# Patient Record
Sex: FEMALE
[De-identification: names, ages, dates, MRNs, and addresses within clinical notes are randomized per-mention and may not be internally consistent; named-entity substitution may affect disease eponyms.]

---

## 2022-08-03 PROBLEM — Z00.00 ENCOUNTER FOR PREVENTIVE HEALTH EXAMINATION: Status: ACTIVE | Noted: 2022-08-03

## 2023-01-10 ENCOUNTER — NON-APPOINTMENT (OUTPATIENT)
Age: 53
End: 2023-01-10

## 2023-01-10 ENCOUNTER — APPOINTMENT (OUTPATIENT)
Dept: ENDOCRINOLOGY | Facility: CLINIC | Age: 53
End: 2023-01-10
Payer: COMMERCIAL

## 2023-01-10 VITALS
HEIGHT: 71 IN | BODY MASS INDEX: 36.54 KG/M2 | WEIGHT: 261 LBS | HEART RATE: 96 BPM | DIASTOLIC BLOOD PRESSURE: 80 MMHG | SYSTOLIC BLOOD PRESSURE: 120 MMHG | OXYGEN SATURATION: 98 %

## 2023-01-10 DIAGNOSIS — Z82.49 FAMILY HISTORY OF ISCHEMIC HEART DISEASE AND OTHER DISEASES OF THE CIRCULATORY SYSTEM: ICD-10-CM

## 2023-01-10 DIAGNOSIS — Z83.3 FAMILY HISTORY OF DIABETES MELLITUS: ICD-10-CM

## 2023-01-10 DIAGNOSIS — Z78.9 OTHER SPECIFIED HEALTH STATUS: ICD-10-CM

## 2023-01-10 DIAGNOSIS — E66.9 OBESITY, UNSPECIFIED: ICD-10-CM

## 2023-01-10 LAB — GLUCOSE BLDC GLUCOMTR-MCNC: 161

## 2023-01-10 PROCEDURE — 82962 GLUCOSE BLOOD TEST: CPT | Mod: NC

## 2023-01-10 PROCEDURE — 99204 OFFICE O/P NEW MOD 45 MIN: CPT | Mod: 25

## 2023-01-10 RX ORDER — CA/D3/MAG OX/ZINC/COP/MANG/BOR 600 MG-800
TABLET,CHEWABLE ORAL
Refills: 0 | Status: ACTIVE | COMMUNITY

## 2023-01-10 RX ORDER — DULAGLUTIDE 0.75 MG/.5ML
0.75 INJECTION, SOLUTION SUBCUTANEOUS
Refills: 0 | Status: DISCONTINUED | COMMUNITY
End: 2023-01-10

## 2023-01-10 RX ORDER — OMEGA-3/DHA/EPA/FISH OIL 300-1000MG
CAPSULE ORAL
Refills: 0 | Status: ACTIVE | COMMUNITY

## 2023-01-10 NOTE — REVIEW OF SYSTEMS
[Nausea] : nausea [Pain/Numbness of Digits] : pain/numbness of digits [Insomnia] : insomnia [Recent Weight Gain (___ Lbs)] : no recent weight gain [Recent Weight Loss (___ Lbs)] : no recent weight loss [Chest Pain] : no chest pain [Shortness Of Breath] : no shortness of breath [Polyuria] : no polyuria [Muscle Cramps] : no muscle cramps [Ulcer] : no ulcer [Polydipsia] : no polydipsia [FreeTextEntry3] : + DR

## 2023-01-10 NOTE — HISTORY OF PRESENT ILLNESS
[FreeTextEntry1] : Here to establish care for DM.\par Transferring care from [facility].\par Was diagnosed with Type 2 DM at age 30.\par She has a strong family history of DM with many siblings, father and mother with Type 2 DM.  \par Has associated complications of mild retinopathy and neuropathy.\par Her control has improved with medications.    \par PMH of HTN, HLD and Obesity.  \par \par \par Currently Diabetic Medication regimen\par Metformin ER 1000 mg BID\par Pioglitazone 30 mg daily\par Trulicity 0.75 mg qweek  (added about one year ago)-  had difficulty tolerating in the past due to nausea.  \par Was intolerant to Farxiga due to yeast infections.    \par \par SMBG:  does test regularly at home (one touch ultra).

## 2023-01-10 NOTE — PHYSICAL EXAM
[Obese] : obese [No Acute Distress] : no acute distress [Normal Sclera/Conjunctiva] : normal sclera/conjunctiva [No Proptosis] : no proptosis [No Neck Mass] : no neck mass was observed [No LAD] : no lymphadenopathy [Supple] : the neck was supple [Thyroid Not Enlarged] : the thyroid was not enlarged [No Thyroid Nodules] : no palpable thyroid nodules [No Respiratory Distress] : no respiratory distress [Clear to Auscultation] : lungs were clear to auscultation bilaterally [Normal S1, S2] : normal S1 and S2 [No Murmurs] : no murmurs [Normal Rate] : heart rate was normal [Regular Rhythm] : with a regular rhythm [No Edema] : no peripheral edema [Normal Gait] : normal gait [No Clubbing, Cyanosis] : no clubbing  or cyanosis of the fingernails [Right foot was examined, including] : right foot ~C was examined, including visual inspection with sensory and pulse exams [Left foot was examined, including] : left foot ~C was examined, including visual inspection with sensory and pulse exams [Normal] : normal [1+] : 1+ in the dorsalis pedis [No Tremors] : no tremors [Normal Affect] : the affect was normal [Normal Insight/Judgement] : insight and judgment were intact [Normal Mood] : the mood was normal [Acanthosis Nigricans] : no acanthosis nigricans

## 2023-05-05 LAB
HBA1C MFR BLD HPLC: 6.6
LDLC SERPL CALC-MCNC: 97
MICROALBUMIN/CREAT 24H UR-RTO: 6

## 2023-05-08 ENCOUNTER — APPOINTMENT (OUTPATIENT)
Dept: ENDOCRINOLOGY | Facility: CLINIC | Age: 53
End: 2023-05-08
Payer: COMMERCIAL

## 2023-05-08 VITALS
HEIGHT: 71 IN | WEIGHT: 264 LBS | BODY MASS INDEX: 36.96 KG/M2 | SYSTOLIC BLOOD PRESSURE: 124 MMHG | DIASTOLIC BLOOD PRESSURE: 78 MMHG | HEART RATE: 78 BPM | OXYGEN SATURATION: 97 %

## 2023-05-08 LAB — GLUCOSE BLDC GLUCOMTR-MCNC: 108

## 2023-05-08 PROCEDURE — 99214 OFFICE O/P EST MOD 30 MIN: CPT | Mod: 25

## 2023-05-08 PROCEDURE — 82962 GLUCOSE BLOOD TEST: CPT | Mod: NC

## 2023-05-08 NOTE — REVIEW OF SYSTEMS
[Recent Weight Gain (___ Lbs)] : no recent weight gain [Recent Weight Loss (___ Lbs)] : no recent weight loss [Nausea] : no nausea

## 2023-05-08 NOTE — HISTORY OF PRESENT ILLNESS
[FreeTextEntry1] : Follow up DM\par \par Was diagnosed with Type 2 DM at age 30.\par She has a strong family history of DM with many siblings, father and mother with Type 2 DM.  \par Has associated complications of mild retinopathy and neuropathy.\par Her control has improved with medications.    \par PMH of HTN, HLD and Obesity.  \par \par \par Currently Diabetic Medication regimen\par Metformin ER 1000 mg BID\par Pioglitazone 30 mg daily\par Trulicity 1.5  mg qweek  (has been able to tolerate this dose without much nausea).  \par Was intolerant to Farxiga due to yeast infections.    \par \par SMBG:  does not test regularly at home (one touch ultra).

## 2023-05-08 NOTE — ASSESSMENT
[FreeTextEntry1] : 52 year old male with Type 2 DM with associated neuropathy and retinopathy, HTN, HLD and obesity here for diabetes follow up.  Her diabetes is well controlled. \par \par 1.  Type 2 DM-     Continue current dose of Trulicity, Pioglitazone and Metformin.  Consider changing Trulicity to Mounjaro in the future for better A1c lowering potential and weight loss, which may allow us to taper off Pioglitazone, however patient would like to wait until supply issues are resolved.   \par \par 2.  HLD-   continue Atorvastatin\par \par 3.  HTN- continue Losartan- HCT.  \par \par Follow up in 4 months.

## 2023-09-01 ENCOUNTER — APPOINTMENT (OUTPATIENT)
Dept: ENDOCRINOLOGY | Facility: CLINIC | Age: 53
End: 2023-09-01
Payer: COMMERCIAL

## 2023-09-01 VITALS
BODY MASS INDEX: 37.54 KG/M2 | WEIGHT: 268.13 LBS | HEART RATE: 80 BPM | TEMPERATURE: 97.4 F | SYSTOLIC BLOOD PRESSURE: 110 MMHG | RESPIRATION RATE: 16 BRPM | HEIGHT: 71 IN | DIASTOLIC BLOOD PRESSURE: 70 MMHG | OXYGEN SATURATION: 98 %

## 2023-09-01 LAB
HBA1C MFR BLD HPLC: 6.5
LDLC SERPL DIRECT ASSAY-MCNC: 105

## 2023-09-01 PROCEDURE — 99214 OFFICE O/P EST MOD 30 MIN: CPT

## 2023-09-01 NOTE — HISTORY OF PRESENT ILLNESS
[FreeTextEntry1] : Follow up DM  Was diagnosed with Type 2 DM at age 30. She has a strong family history of DM with many siblings, father and mother with Type 2 DM.   Has associated complications of mild retinopathy and neuropathy. Her control has improved with medications.     PMH of HTN, HLD and Obesity.    Currently Diabetic Medication regimen Metformin ER 1000 mg BID Pioglitazone 30 mg daily Trulicity 1.5  mg qweek     Was intolerant to Farxiga due to yeast infections.      SMBG:  does not test regularly at home (one touch ultra).

## 2023-09-01 NOTE — ASSESSMENT
[FreeTextEntry1] : 53 year old male with Type 2 DM with associated neuropathy and retinopathy, HTN, HLD and obesity here for diabetes follow up.  Her diabetes is well controlled.   1.  Type 2 DM-   Will change Trulicity to Mounjaro for better efficacy and weight loss effects.  If next A1c improves, will start tapering off Actos.  Continue Metformin.    2.  HLD-   continue Atorvastatin.  Advised weight loss.  If next LDL is above target, will increase dose.    3.  HTN- continue Losartan- HCT.    Follow up in 4 months.

## 2023-09-12 RX ORDER — TIRZEPATIDE 5 MG/.5ML
5 INJECTION, SOLUTION SUBCUTANEOUS
Qty: 3 | Refills: 1 | Status: COMPLETED | COMMUNITY
Start: 2023-09-01 | End: 2023-09-12

## 2023-09-18 ENCOUNTER — NON-APPOINTMENT (OUTPATIENT)
Age: 53
End: 2023-09-18

## 2024-01-08 ENCOUNTER — APPOINTMENT (OUTPATIENT)
Dept: ENDOCRINOLOGY | Facility: CLINIC | Age: 54
End: 2024-01-08
Payer: COMMERCIAL

## 2024-01-08 VITALS
HEART RATE: 80 BPM | HEIGHT: 71 IN | OXYGEN SATURATION: 99 % | BODY MASS INDEX: 41.02 KG/M2 | SYSTOLIC BLOOD PRESSURE: 110 MMHG | WEIGHT: 293 LBS | DIASTOLIC BLOOD PRESSURE: 70 MMHG | RESPIRATION RATE: 16 BRPM | TEMPERATURE: 97.6 F

## 2024-01-08 LAB
HBA1C MFR BLD HPLC: 6.2
LDLC SERPL DIRECT ASSAY-MCNC: 94
MICROALBUMIN/CREAT 24H UR-RTO: 8

## 2024-01-08 PROCEDURE — 99214 OFFICE O/P EST MOD 30 MIN: CPT

## 2024-01-08 RX ORDER — PIOGLITAZONE HYDROCHLORIDE 30 MG/1
30 TABLET ORAL DAILY
Qty: 90 | Refills: 1 | Status: DISCONTINUED | COMMUNITY
Start: 1900-01-01 | End: 2024-01-08

## 2024-01-08 RX ORDER — DULAGLUTIDE 3 MG/.5ML
3 INJECTION, SOLUTION SUBCUTANEOUS
Qty: 3 | Refills: 0 | Status: DISCONTINUED | COMMUNITY
Start: 2023-01-10 | End: 2024-01-08

## 2024-01-08 NOTE — ASSESSMENT
[FreeTextEntry1] : 53 year old male with Type 2 DM with associated neuropathy and retinopathy, HTN, HLD and obesity here for diabetes follow up. Her diabetes control is improving.     1. Type 2 DM-   Will attempt to start weaning dose of Pioglitazone to reduce side effect risk, including weight gain.  Reduce to 15 mg daily.  Continue Metformin.   Will increase Trulicity to 4.5 mg qweek for added BG control as pioglitazone is weaned off.    2. HLD- continue Atorvastatin.  3. HTN- continue Losartan- HCT.  Follow up in 4 months

## 2024-01-08 NOTE — REVIEW OF SYSTEMS
[Recent Weight Loss (___ Lbs)] : recent weight loss: [unfilled] lbs [Chest Pain] : no chest pain [Shortness Of Breath] : no shortness of breath [Nausea] : no nausea [Constipation] : no constipation

## 2024-01-08 NOTE — HISTORY OF PRESENT ILLNESS
[FreeTextEntry1] : Follow up DM  Was diagnosed with Type 2 DM at age 30. She has a strong family history of DM with many siblings, father and mother with Type 2 DM.   Has associated complications of mild retinopathy and neuropathy. Her control has improved with medications.     PMH of HTN, HLD and Obesity.    Currently Diabetic Medication regimen Metformin ER 1000 mg BID Pioglitazone 30 mg daily Trulicity 3 mg qweek-  has been having trouble finding this dose.       Mounjaro was too costly. Was intolerant to Farxiga due to yeast infections.      SMBG:  does not test regularly at home (one touch ultra).     Weight has increased.

## 2024-01-16 RX ORDER — METFORMIN ER 500 MG 500 MG/1
500 TABLET ORAL
Qty: 360 | Refills: 1 | Status: ACTIVE | COMMUNITY
Start: 1900-01-01 | End: 1900-01-01

## 2024-04-02 RX ORDER — ATORVASTATIN CALCIUM 10 MG/1
10 TABLET, FILM COATED ORAL
Qty: 90 | Refills: 1 | Status: ACTIVE | COMMUNITY
Start: 1900-01-01 | End: 1900-01-01

## 2024-04-02 RX ORDER — LOSARTAN POTASSIUM AND HYDROCHLOROTHIAZIDE 12.5; 5 MG/1; MG/1
50-12.5 TABLET ORAL
Qty: 90 | Refills: 1 | Status: ACTIVE | COMMUNITY
Start: 1900-01-01 | End: 1900-01-01

## 2024-05-06 ENCOUNTER — APPOINTMENT (OUTPATIENT)
Dept: ENDOCRINOLOGY | Facility: CLINIC | Age: 54
End: 2024-05-06
Payer: COMMERCIAL

## 2024-05-06 VITALS
WEIGHT: 264 LBS | OXYGEN SATURATION: 98 % | BODY MASS INDEX: 36.96 KG/M2 | DIASTOLIC BLOOD PRESSURE: 78 MMHG | SYSTOLIC BLOOD PRESSURE: 112 MMHG | HEIGHT: 71 IN | RESPIRATION RATE: 16 BRPM | HEART RATE: 95 BPM

## 2024-05-06 DIAGNOSIS — E11.40 TYPE 2 DIABETES MELLITUS WITH DIABETIC NEUROPATHY, UNSPECIFIED: ICD-10-CM

## 2024-05-06 DIAGNOSIS — E78.5 HYPERLIPIDEMIA, UNSPECIFIED: ICD-10-CM

## 2024-05-06 DIAGNOSIS — I10 ESSENTIAL (PRIMARY) HYPERTENSION: ICD-10-CM

## 2024-05-06 LAB
HBA1C MFR BLD HPLC: 6.4
LDLC SERPL DIRECT ASSAY-MCNC: 96
LDLC SERPL DIRECT ASSAY-MCNC: 96

## 2024-05-06 PROCEDURE — 99214 OFFICE O/P EST MOD 30 MIN: CPT

## 2024-05-06 RX ORDER — PIOGLITAZONE HYDROCHLORIDE 15 MG/1
15 TABLET ORAL DAILY
Qty: 90 | Refills: 1 | Status: DISCONTINUED | COMMUNITY
Start: 2024-01-08 | End: 2024-05-06

## 2024-05-06 NOTE — HISTORY OF PRESENT ILLNESS
[FreeTextEntry1] : Follow up DM  Was diagnosed with Type 2 DM at age 30. She has a strong family history of DM with many siblings, father and mother with Type 2 DM.   Has associated complications of mild retinopathy and neuropathy. Her control has improved with medications.     PMH of HTN, HLD and Obesity.    Currently Diabetic Medication regimen Metformin ER 1000 mg BID Trulicity 4.5 mg qweek   (increased last visit) Mounjaro was too costly. Came off pioglitazone after last visit.    Was intolerant to Farxiga due to yeast infections.      SMBG:  does not test regularly at home (one touch ultra).    Reports that after meals is usually under 150 mg/dl.

## 2024-05-06 NOTE — DATA REVIEWED
[FreeTextEntry1] : LABS: 5/2/2024: A1c 6.4% LDL 96  8/28/2023:  A1c 6.5% TSH 1.28  8/8/2022: Glucose 155 A1c 6.8  UACR < 30

## 2024-05-06 NOTE — ASSESSMENT
[FreeTextEntry1] : 53 year old male with Type 2 DM with associated neuropathy and retinopathy, HTN, HLD and obesity here for diabetes follow up. Her diabetes is well controlled.    1. Type 2 DM-  Continue current dose of Trulicity and Metformin.    Patient does not want to try changing to Ozempic for more weight loss effect at this time.    2. HLD- continue Atorvastatin.  3. HTN- continue Losartan- HCT.  Follow up in 4 months

## 2024-09-19 PROBLEM — N81.9 FEMALE GENITAL PROLAPSE, UNSPECIFIED TYPE: Status: ACTIVE | Noted: 2024-09-19

## 2024-09-22 ENCOUNTER — NON-APPOINTMENT (OUTPATIENT)
Age: 54
End: 2024-09-22

## 2024-09-23 ENCOUNTER — APPOINTMENT (OUTPATIENT)
Dept: ENDOCRINOLOGY | Facility: CLINIC | Age: 54
End: 2024-09-23
Payer: COMMERCIAL

## 2024-09-23 ENCOUNTER — NON-APPOINTMENT (OUTPATIENT)
Age: 54
End: 2024-09-23

## 2024-09-23 VITALS
DIASTOLIC BLOOD PRESSURE: 60 MMHG | TEMPERATURE: 98 F | SYSTOLIC BLOOD PRESSURE: 110 MMHG | WEIGHT: 287 LBS | BODY MASS INDEX: 40.18 KG/M2 | OXYGEN SATURATION: 98 % | RESPIRATION RATE: 16 BRPM | HEIGHT: 71 IN | HEART RATE: 90 BPM

## 2024-09-23 DIAGNOSIS — E78.5 HYPERLIPIDEMIA, UNSPECIFIED: ICD-10-CM

## 2024-09-23 DIAGNOSIS — E11.40 TYPE 2 DIABETES MELLITUS WITH DIABETIC NEUROPATHY, UNSPECIFIED: ICD-10-CM

## 2024-09-23 DIAGNOSIS — I10 ESSENTIAL (PRIMARY) HYPERTENSION: ICD-10-CM

## 2024-09-23 PROCEDURE — 99214 OFFICE O/P EST MOD 30 MIN: CPT

## 2024-09-23 RX ORDER — ATORVASTATIN CALCIUM 20 MG/1
20 TABLET, FILM COATED ORAL
Qty: 90 | Refills: 0 | Status: ACTIVE | COMMUNITY
Start: 2024-06-25

## 2024-09-23 RX ORDER — TIRZEPATIDE 5 MG/.5ML
5 INJECTION, SOLUTION SUBCUTANEOUS
Qty: 1 | Refills: 3 | Status: ACTIVE | COMMUNITY
Start: 2024-09-23 | End: 1900-01-01

## 2024-09-23 NOTE — HISTORY OF PRESENT ILLNESS
[FreeTextEntry1] : Follow up DM  Was diagnosed with Type 2 DM at age 30. She has a strong family history of DM with many siblings, father and mother with Type 2 DM.   Has associated complications of mild retinopathy and neuropathy. Her control has improved with medications.     PMH of HTN, HLD and Obesity.    Currently Diabetic Medication regimen Metformin ER 1000 mg BID Trulicity 4.5 mg qweek   Mounjaro was too costly. Took pioglitazone in the past. Was intolerant to Farxiga due to yeast infections.      SMBG:  does not test regularly at home (one touch ultra).    PCP just increased Atorvastatin to 20 mg daily.

## 2024-09-23 NOTE — DATA REVIEWED
[FreeTextEntry1] : LABS: 9/16/2024:  A1c 7.4% TSH 1.29  5/2/2024: A1c 6.4% LDL 96  8/28/2023:  A1c 6.5% TSH 1.28  8/8/2022: Glucose 155 A1c 6.8  UACR < 30

## 2024-09-23 NOTE — ASSESSMENT
[FreeTextEntry1] : 54 year old male with Type 2 DM with associated neuropathy and retinopathy, HTN, HLD and obesity here for diabetes follow up. Her diabetes control worsened since last visit.     1. Type 2 DM-  Advised increased exercise and diet modification.  Patient will check insurance to see if Mounjaro is now covered.  If so, would change Trulicity to Mounjaro starting at 5 mg qweek for better efficacy.   Continue current dose of metformin.   2. HLD-   LDL is suboptimal.  Agree with increase in dose to 20 mg daily.    3. HTN- continue Losartan- HCT.  Follow up in 4 months

## 2024-09-23 NOTE — REVIEW OF SYSTEMS
[Recent Weight Loss (___ Lbs)] : recent weight loss: [unfilled] lbs [Nausea] : no nausea [Diarrhea] : no diarrhea

## 2024-09-24 ENCOUNTER — APPOINTMENT (OUTPATIENT)
Dept: UROGYNECOLOGY | Facility: CLINIC | Age: 54
End: 2024-09-24

## 2024-09-24 ENCOUNTER — RX RENEWAL (OUTPATIENT)
Age: 54
End: 2024-09-24

## 2024-09-24 VITALS
DIASTOLIC BLOOD PRESSURE: 83 MMHG | HEIGHT: 71 IN | WEIGHT: 254 LBS | SYSTOLIC BLOOD PRESSURE: 150 MMHG | BODY MASS INDEX: 35.56 KG/M2

## 2024-09-24 DIAGNOSIS — N81.9 FEMALE GENITAL PROLAPSE, UNSPECIFIED: ICD-10-CM

## 2024-09-24 DIAGNOSIS — E11.9 TYPE 2 DIABETES MELLITUS W/OUT COMPLICATIONS: ICD-10-CM

## 2024-09-24 DIAGNOSIS — K57.90 DIVERTICULOSIS OF INTESTINE, PART UNSPECIFIED, W/OUT PERFORATION OR ABSCESS W/OUT BLEEDING: ICD-10-CM

## 2024-09-24 DIAGNOSIS — Z82.49 FAMILY HISTORY OF ISCHEMIC HEART DISEASE AND OTHER DISEASES OF THE CIRCULATORY SYSTEM: ICD-10-CM

## 2024-09-24 DIAGNOSIS — J45.909 UNSPECIFIED ASTHMA, UNCOMPLICATED: ICD-10-CM

## 2024-09-24 DIAGNOSIS — E78.00 PURE HYPERCHOLESTEROLEMIA, UNSPECIFIED: ICD-10-CM

## 2024-09-24 DIAGNOSIS — Z80.1 FAMILY HISTORY OF MALIGNANT NEOPLASM OF TRACHEA, BRONCHUS AND LUNG: ICD-10-CM

## 2024-09-24 DIAGNOSIS — R32 UNSPECIFIED URINARY INCONTINENCE: ICD-10-CM

## 2024-09-24 DIAGNOSIS — Z82.5 FAMILY HISTORY OF ASTHMA AND OTHER CHRONIC LOWER RESPIRATORY DISEASES: ICD-10-CM

## 2024-09-24 DIAGNOSIS — S39.92XA UNSPECIFIED INJURY OF LOWER BACK, INITIAL ENCOUNTER: ICD-10-CM

## 2024-09-24 DIAGNOSIS — M53.9 DORSOPATHY, UNSPECIFIED: ICD-10-CM

## 2024-09-24 DIAGNOSIS — Z83.3 FAMILY HISTORY OF DIABETES MELLITUS: ICD-10-CM

## 2024-09-24 DIAGNOSIS — N81.6 RECTOCELE: ICD-10-CM

## 2024-09-24 DIAGNOSIS — Z80.3 FAMILY HISTORY OF MALIGNANT NEOPLASM OF BREAST: ICD-10-CM

## 2024-09-24 LAB
BILIRUB UR QL STRIP: NEGATIVE
CLARITY UR: CLEAR
COLLECTION METHOD: NORMAL
GLUCOSE UR-MCNC: NEGATIVE
HCG UR QL: 0.2 EU/DL
HGB UR QL STRIP.AUTO: NEGATIVE
KETONES UR-MCNC: NEGATIVE
LEUKOCYTE ESTERASE UR QL STRIP: NORMAL
NITRITE UR QL STRIP: NEGATIVE
PH UR STRIP: 5.5
PROT UR STRIP-MCNC: NEGATIVE
SP GR UR STRIP: 1.02

## 2024-09-24 PROCEDURE — 81003 URINALYSIS AUTO W/O SCOPE: CPT | Mod: QW

## 2024-09-24 PROCEDURE — 99204 OFFICE O/P NEW MOD 45 MIN: CPT | Mod: 25

## 2024-09-24 PROCEDURE — 99459 PELVIC EXAMINATION: CPT

## 2024-09-24 PROCEDURE — 51701 INSERT BLADDER CATHETER: CPT | Mod: 59

## 2024-09-24 RX ORDER — GABAPENTIN 250 MG/5ML
300 SOLUTION ORAL
Refills: 0 | Status: ACTIVE | COMMUNITY

## 2024-09-24 RX ORDER — COLD-HOT PACK
50 EACH MISCELLANEOUS
Refills: 0 | Status: ACTIVE | COMMUNITY

## 2024-09-24 RX ORDER — ATORVASTATIN CALCIUM 10 MG/1
10 TABLET, FILM COATED ORAL
Refills: 0 | Status: ACTIVE | COMMUNITY

## 2024-09-24 RX ORDER — LANOLIN ALCOHOL/MO/W.PET/CERES
500 CREAM (GRAM) TOPICAL
Refills: 0 | Status: ACTIVE | COMMUNITY

## 2024-09-24 NOTE — HISTORY OF PRESENT ILLNESS
[FreeTextEntry1] : BIJU is a 54 year female who presents for prolapse. She was told of the prolapse after birth of her son 20 years ago. Was living it without an issue until a few months ago and it got significantly worse. She feels pressure when she has to have a BM or urinate. She does have urgency and will sometimes not make it to the bathroom. No leaking with sneeze or cough> Not wearing pads. She does feel she empties her bladder fully but she always feels like she has to urinate. Is aware of a pessary but wants more definitive treatment.    HGBA1C 5/2024 6.4 - as per patient had one yesterday was told it was 7.4. Starting mounjaro as of this week.    Daytime frequency: yes Nocturia: 1-2 Urinary urgency: yes Leakage of urine with urgency: yes Leakage of urine with coughing sneezing laughing: denies Incontinence pad use: denies Sensation of incomplete bladder emptying: denies History of frequent urinary tract infections: denies History of hematuria: denies Previous treatment: kegel Vaginal symptoms: pressure Bowel symptoms: bowel urgency and occasional bowel accident

## 2024-09-24 NOTE — ADDENDUM
[FreeTextEntry1] : This note was written by Jody Mcmullen, acting as the  for Dr. Crouch. This note accurately reflects the work and decisions made by Dr. Crouch.

## 2024-09-24 NOTE — LETTER BODY
[Dear  ___] : Dear  [unfilled], [I had the pleasure of evaluating your patient, [unfilled]. Thank you for referring Ms. [unfilled] for consultation for ___] : I had the pleasure of evaluating your patient, [unfilled]. Thank you for referring Ms. [unfilled] for consultation for [unfilled]. [Attached please find my note.] : Attached please find my note. [Thank you very much for allowing me to participate in the care of this patient. If you have any questions, please do not hesitate to contact me] : Thank you very much for allowing me to participate in the care of this patient. If you have any questions, please do not hesitate to contact me. [DrRaine  ___] : Dr. MEJÍA

## 2024-09-24 NOTE — PHYSICAL EXAM
[Chaperone Present] : A chaperone was present in the examining room during all aspects of the physical examination [09983] : A chaperone was present during the pelvic exam. [No Acute Distress] : in no acute distress [Well developed] : well developed [Well Nourished] : ~L well nourished [Oriented x3] : oriented to person, place, and time [No Edema] : ~T edema was not present [Warm and Dry] : was warm and dry to touch [Normal Gait] : gait was normal [Normal Appearance] : general appearance was normal [2] : 2 [Aa ____] : Aa [unfilled] [Ba ____] : Ba [unfilled] [C ____] : C [unfilled] [GH ____] : GH [unfilled] [PB ____] : PB [unfilled] [TVL ____] : TVL  [unfilled] [Ap ____] : Ap [unfilled] [Bp ____] : Bp [unfilled] [D ____] : D [unfilled] [Normal] : no abnormalities [Post Void Residual ____ml] : post void residual was [unfilled] ml [FreeTextEntry2] : Rand [Cough] : no cough [Tenderness] : ~T no ~M abdominal tenderness observed [Distended] : not distended [Hernia] : no hernia observed

## 2024-09-24 NOTE — DISCUSSION/SUMMARY
[FreeTextEntry1] : BIJU is a 54 year female who presents for rectocele, urgency, leaking of urine. On exam, normal PVR. We talked about the etiology and natural progression of pelvic organ prolapse. We discussed the treatment options of a pessary, physical therapy or surgery. In terms of surgery we talked about open procedures, robotic assisted procedures and vaginal reconstruction with or without the utilization of graft material. We also discussed what is involved in recovery, post-op restrictions including restrictions on lifting, submerging in water and exercise. We discussed the difference between mesh augmented repairs and native tissue repairs.  We also discussed abdominal mesh versus vaginal mesh. I recommended bladder testing UDTs. I gave her some literature on pelvic floor muscle strengthening and posterior repair. I sent her urine to the lab. I was able to answer all of her questions. All questions answered.

## 2024-09-24 NOTE — OB HISTORY
[Vaginal ___] : [unfilled] vaginal delivery(s) [Approximately ___ (Month)] : the LMP was approximately [unfilled] month(s) ago [Last Pap Smear ___] : date of last pap smear was on [unfilled] [Sexually Active] : sexually active [Regular Cycle Intervals] : periods have been irregular [Abnormal Pap Smear] : normal pap smear [Taking Estrogens] : is not taking estrogen replacement [FreeTextEntry1] : baby weighed 9#14

## 2024-09-24 NOTE — PHYSICAL EXAM
[Chaperone Present] : A chaperone was present in the examining room during all aspects of the physical examination [01924] : A chaperone was present during the pelvic exam. [No Acute Distress] : in no acute distress [Well developed] : well developed [Well Nourished] : ~L well nourished [Oriented x3] : oriented to person, place, and time [No Edema] : ~T edema was not present [Warm and Dry] : was warm and dry to touch [Normal Gait] : gait was normal [Normal Appearance] : general appearance was normal [2] : 2 [Aa ____] : Aa [unfilled] [Ba ____] : Ba [unfilled] [C ____] : C [unfilled] [GH ____] : GH [unfilled] [PB ____] : PB [unfilled] [TVL ____] : TVL  [unfilled] [Ap ____] : Ap [unfilled] [Bp ____] : Bp [unfilled] [D ____] : D [unfilled] [Normal] : no abnormalities [Post Void Residual ____ml] : post void residual was [unfilled] ml [FreeTextEntry2] : Rand [Cough] : no cough [Tenderness] : ~T no ~M abdominal tenderness observed [Distended] : not distended [Hernia] : no hernia observed

## 2024-09-24 NOTE — PROCEDURE
[Straight Catheterization] : insertion of a straight catheter [Urgent Incontinence] : urgent incontinence [None] : there were no complications with the catheter insertion [No Complications] : no complications [Tolerated Well] : the patient tolerated the procedure well [Post procedure instructions and information given] : Post procedure instructions and information were given and reviewed with patient.

## 2024-09-24 NOTE — REASON FOR VISIT
[Initial Visit ___] : an initial visit for [unfilled] [Pelvic Organ Prolapse] : pelvic organ prolapse [Urinary Urgency] : urinary urgency

## 2024-09-24 NOTE — PHYSICAL EXAM
[Chaperone Present] : A chaperone was present in the examining room during all aspects of the physical examination [84600] : A chaperone was present during the pelvic exam. [No Acute Distress] : in no acute distress [Well developed] : well developed [Well Nourished] : ~L well nourished [Oriented x3] : oriented to person, place, and time [No Edema] : ~T edema was not present [Warm and Dry] : was warm and dry to touch [Normal Gait] : gait was normal [Normal Appearance] : general appearance was normal [2] : 2 [Aa ____] : Aa [unfilled] [Ba ____] : Ba [unfilled] [C ____] : C [unfilled] [GH ____] : GH [unfilled] [PB ____] : PB [unfilled] [TVL ____] : TVL  [unfilled] [Ap ____] : Ap [unfilled] [Bp ____] : Bp [unfilled] [D ____] : D [unfilled] [Normal] : no abnormalities [Post Void Residual ____ml] : post void residual was [unfilled] ml [FreeTextEntry2] : Rand [Cough] : no cough [Tenderness] : ~T no ~M abdominal tenderness observed [Distended] : not distended [Hernia] : no hernia observed

## 2024-09-25 LAB
APPEARANCE: CLEAR
BACTERIA: NEGATIVE /HPF
BILIRUBIN URINE: NEGATIVE
BLOOD URINE: NEGATIVE
CALCIUM OXALATE CRYSTALS: PRESENT
CAST: 1 /LPF
COLOR: YELLOW
EPITHELIAL CELLS: 1 /HPF
GLUCOSE QUALITATIVE U: NEGATIVE MG/DL
KETONES URINE: NEGATIVE MG/DL
LEUKOCYTE ESTERASE URINE: NEGATIVE
MICROSCOPIC-UA: NORMAL
NITRITE URINE: NEGATIVE
PH URINE: 5.5
PROTEIN URINE: NEGATIVE MG/DL
RED BLOOD CELLS URINE: 0 /HPF
REVIEW: NORMAL
SPECIFIC GRAVITY URINE: 1.02
UROBILINOGEN URINE: 0.2 MG/DL
WHITE BLOOD CELLS URINE: 0 /HPF

## 2024-09-27 LAB — BACTERIA UR CULT: NORMAL

## 2024-10-14 ENCOUNTER — APPOINTMENT (OUTPATIENT)
Dept: UROGYNECOLOGY | Facility: CLINIC | Age: 54
End: 2024-10-14

## 2024-11-18 ENCOUNTER — APPOINTMENT (OUTPATIENT)
Dept: UROGYNECOLOGY | Facility: CLINIC | Age: 54
End: 2024-11-18

## 2024-11-18 DIAGNOSIS — R35.1 NOCTURIA: ICD-10-CM

## 2024-11-18 DIAGNOSIS — N81.6 RECTOCELE: ICD-10-CM

## 2024-11-18 DIAGNOSIS — N36.41 HYPERMOBILITY OF URETHRA: ICD-10-CM

## 2024-11-18 DIAGNOSIS — R39.15 URGENCY OF URINATION: ICD-10-CM

## 2024-11-18 PROCEDURE — 99214 OFFICE O/P EST MOD 30 MIN: CPT

## 2024-11-18 PROCEDURE — 99459 PELVIC EXAMINATION: CPT

## 2024-11-25 ENCOUNTER — APPOINTMENT (OUTPATIENT)
Dept: UROGYNECOLOGY | Facility: CLINIC | Age: 54
End: 2024-11-25

## 2024-12-02 ENCOUNTER — APPOINTMENT (OUTPATIENT)
Dept: UROGYNECOLOGY | Facility: CLINIC | Age: 54
End: 2024-12-02

## 2024-12-18 ENCOUNTER — RX RENEWAL (OUTPATIENT)
Age: 54
End: 2024-12-18

## 2025-01-03 LAB
HBA1C MFR BLD HPLC: 8.2
LDLC SERPL DIRECT ASSAY-MCNC: 76
TSH SERPL-ACNC: 1.31

## 2025-01-06 ENCOUNTER — APPOINTMENT (OUTPATIENT)
Dept: ENDOCRINOLOGY | Facility: CLINIC | Age: 55
End: 2025-01-06
Payer: COMMERCIAL

## 2025-01-06 ENCOUNTER — NON-APPOINTMENT (OUTPATIENT)
Age: 55
End: 2025-01-06

## 2025-01-06 VITALS
DIASTOLIC BLOOD PRESSURE: 80 MMHG | HEIGHT: 71 IN | RESPIRATION RATE: 16 BRPM | SYSTOLIC BLOOD PRESSURE: 120 MMHG | WEIGHT: 252 LBS | BODY MASS INDEX: 35.28 KG/M2 | HEART RATE: 90 BPM | TEMPERATURE: 98 F | OXYGEN SATURATION: 99 %

## 2025-01-06 DIAGNOSIS — E78.5 HYPERLIPIDEMIA, UNSPECIFIED: ICD-10-CM

## 2025-01-06 DIAGNOSIS — I10 ESSENTIAL (PRIMARY) HYPERTENSION: ICD-10-CM

## 2025-01-06 DIAGNOSIS — E11.40 TYPE 2 DIABETES MELLITUS WITH DIABETIC NEUROPATHY, UNSPECIFIED: ICD-10-CM

## 2025-01-06 PROCEDURE — 99214 OFFICE O/P EST MOD 30 MIN: CPT

## 2025-01-14 RX ORDER — TIRZEPATIDE 7.5 MG/.5ML
7.5 INJECTION, SOLUTION SUBCUTANEOUS
Qty: 3 | Refills: 1 | Status: ACTIVE | COMMUNITY
Start: 2025-01-06 | End: 1900-01-01

## 2025-01-30 ENCOUNTER — APPOINTMENT (OUTPATIENT)
Dept: UROGYNECOLOGY | Facility: CLINIC | Age: 55
End: 2025-01-30

## 2025-03-24 ENCOUNTER — APPOINTMENT (OUTPATIENT)
Dept: ENDOCRINOLOGY | Facility: CLINIC | Age: 55
End: 2025-03-24

## 2025-04-14 ENCOUNTER — APPOINTMENT (OUTPATIENT)
Dept: UROGYNECOLOGY | Facility: CLINIC | Age: 55
End: 2025-04-14

## 2025-05-05 ENCOUNTER — APPOINTMENT (OUTPATIENT)
Dept: ENDOCRINOLOGY | Facility: CLINIC | Age: 55
End: 2025-05-05
Payer: COMMERCIAL

## 2025-05-05 VITALS
WEIGHT: 257 LBS | BODY MASS INDEX: 35.98 KG/M2 | DIASTOLIC BLOOD PRESSURE: 78 MMHG | RESPIRATION RATE: 16 BRPM | OXYGEN SATURATION: 98 % | HEART RATE: 86 BPM | HEIGHT: 71 IN | SYSTOLIC BLOOD PRESSURE: 116 MMHG

## 2025-05-05 DIAGNOSIS — E11.40 TYPE 2 DIABETES MELLITUS WITH DIABETIC NEUROPATHY, UNSPECIFIED: ICD-10-CM

## 2025-05-05 DIAGNOSIS — I10 ESSENTIAL (PRIMARY) HYPERTENSION: ICD-10-CM

## 2025-05-05 DIAGNOSIS — E78.5 HYPERLIPIDEMIA, UNSPECIFIED: ICD-10-CM

## 2025-05-05 PROCEDURE — 99214 OFFICE O/P EST MOD 30 MIN: CPT

## 2025-05-05 RX ORDER — TIRZEPATIDE 10 MG/.5ML
10 INJECTION, SOLUTION SUBCUTANEOUS
Qty: 3 | Refills: 1 | Status: ACTIVE | COMMUNITY
Start: 2025-05-05 | End: 1900-01-01

## 2025-05-05 RX ORDER — GABAPENTIN 300 MG/1
300 CAPSULE ORAL
Qty: 30 | Refills: 0 | Status: ACTIVE | COMMUNITY
Start: 2025-04-18

## 2025-07-14 ENCOUNTER — RX RENEWAL (OUTPATIENT)
Age: 55
End: 2025-07-14

## 2025-09-08 ENCOUNTER — APPOINTMENT (OUTPATIENT)
Dept: ENDOCRINOLOGY | Facility: CLINIC | Age: 55
End: 2025-09-08
Payer: COMMERCIAL

## 2025-09-08 VITALS
WEIGHT: 258 LBS | RESPIRATION RATE: 16 BRPM | SYSTOLIC BLOOD PRESSURE: 124 MMHG | OXYGEN SATURATION: 98 % | TEMPERATURE: 98 F | DIASTOLIC BLOOD PRESSURE: 84 MMHG | BODY MASS INDEX: 36.12 KG/M2 | HEART RATE: 83 BPM | HEIGHT: 71 IN

## 2025-09-08 DIAGNOSIS — I10 ESSENTIAL (PRIMARY) HYPERTENSION: ICD-10-CM

## 2025-09-08 DIAGNOSIS — E78.5 HYPERLIPIDEMIA, UNSPECIFIED: ICD-10-CM

## 2025-09-08 DIAGNOSIS — E11.40 TYPE 2 DIABETES MELLITUS WITH DIABETIC NEUROPATHY, UNSPECIFIED: ICD-10-CM

## 2025-09-08 PROCEDURE — 99214 OFFICE O/P EST MOD 30 MIN: CPT

## 2025-09-08 PROCEDURE — G2211 COMPLEX E/M VISIT ADD ON: CPT | Mod: NC
